# Patient Record
Sex: FEMALE | Race: BLACK OR AFRICAN AMERICAN | NOT HISPANIC OR LATINO | Employment: PART TIME | URBAN - METROPOLITAN AREA
[De-identification: names, ages, dates, MRNs, and addresses within clinical notes are randomized per-mention and may not be internally consistent; named-entity substitution may affect disease eponyms.]

---

## 2017-05-13 ENCOUNTER — HOSPITAL ENCOUNTER (EMERGENCY)
Facility: HOSPITAL | Age: 42
Discharge: HOME | End: 2017-05-13
Attending: EMERGENCY MEDICINE
Payer: MEDICAID

## 2017-05-13 VITALS
DIASTOLIC BLOOD PRESSURE: 83 MMHG | SYSTOLIC BLOOD PRESSURE: 129 MMHG | RESPIRATION RATE: 18 BRPM | HEART RATE: 64 BPM | HEIGHT: 66 IN | TEMPERATURE: 98 F | OXYGEN SATURATION: 99 % | BODY MASS INDEX: 25.71 KG/M2 | WEIGHT: 160 LBS

## 2017-05-13 DIAGNOSIS — R42 DIZZINESS: ICD-10-CM

## 2017-05-13 DIAGNOSIS — R74.8 ELEVATED LIPASE: ICD-10-CM

## 2017-05-13 DIAGNOSIS — R10.12 LEFT UPPER QUADRANT PAIN: Primary | ICD-10-CM

## 2017-05-13 LAB
ALBUMIN SERPL BCP-MCNC: 4.1 G/DL
ALP SERPL-CCNC: 64 U/L
ALT SERPL W/O P-5'-P-CCNC: 10 U/L
ANION GAP SERPL CALC-SCNC: 11 MMOL/L
AST SERPL-CCNC: 18 U/L
B-HCG UR QL: NEGATIVE
BASOPHILS # BLD AUTO: 0.02 K/UL
BASOPHILS NFR BLD: 0.3 %
BILIRUB SERPL-MCNC: 0.5 MG/DL
BILIRUB UR QL STRIP: NEGATIVE
BUN SERPL-MCNC: 13 MG/DL
CALCIUM SERPL-MCNC: 9.6 MG/DL
CHLORIDE SERPL-SCNC: 103 MMOL/L
CLARITY UR: CLEAR
CO2 SERPL-SCNC: 25 MMOL/L
COLOR UR: YELLOW
CREAT SERPL-MCNC: 1 MG/DL
CTP QC/QA: YES
DIFFERENTIAL METHOD: ABNORMAL
EOSINOPHIL # BLD AUTO: 0.2 K/UL
EOSINOPHIL NFR BLD: 2.6 %
ERYTHROCYTE [DISTWIDTH] IN BLOOD BY AUTOMATED COUNT: 13.7 %
EST. GFR  (AFRICAN AMERICAN): >60 ML/MIN/1.73 M^2
EST. GFR  (NON AFRICAN AMERICAN): >60 ML/MIN/1.73 M^2
GLUCOSE SERPL-MCNC: 106 MG/DL
GLUCOSE UR QL STRIP: NEGATIVE
HCT VFR BLD AUTO: 39.7 %
HGB BLD-MCNC: 13.2 G/DL
HGB UR QL STRIP: NEGATIVE
KETONES UR QL STRIP: NEGATIVE
LEUKOCYTE ESTERASE UR QL STRIP: NEGATIVE
LIPASE SERPL-CCNC: 74 U/L
LYMPHOCYTES # BLD AUTO: 3 K/UL
LYMPHOCYTES NFR BLD: 51.5 %
MCH RBC QN AUTO: 28.9 PG
MCHC RBC AUTO-ENTMCNC: 33.2 %
MCV RBC AUTO: 87 FL
MICROSCOPIC COMMENT: NORMAL
MONOCYTES # BLD AUTO: 0.5 K/UL
MONOCYTES NFR BLD: 8.4 %
NEUTROPHILS # BLD AUTO: 2.2 K/UL
NEUTROPHILS NFR BLD: 37.2 %
NITRITE UR QL STRIP: NEGATIVE
PH UR STRIP: 5 [PH] (ref 5–8)
PLATELET # BLD AUTO: 163 K/UL
PMV BLD AUTO: 11.4 FL
POTASSIUM SERPL-SCNC: 3.6 MMOL/L
PROT SERPL-MCNC: 7.4 G/DL
PROT UR QL STRIP: NEGATIVE
RBC # BLD AUTO: 4.57 M/UL
SODIUM SERPL-SCNC: 139 MMOL/L
SP GR UR STRIP: 1.02 (ref 1–1.03)
URN SPEC COLLECT METH UR: NORMAL
UROBILINOGEN UR STRIP-ACNC: NEGATIVE EU/DL
WBC # BLD AUTO: 5.86 K/UL

## 2017-05-13 PROCEDURE — 81000 URINALYSIS NONAUTO W/SCOPE: CPT

## 2017-05-13 PROCEDURE — 96372 THER/PROPH/DIAG INJ SC/IM: CPT

## 2017-05-13 PROCEDURE — 99284 EMERGENCY DEPT VISIT MOD MDM: CPT | Mod: 25

## 2017-05-13 PROCEDURE — 96374 THER/PROPH/DIAG INJ IV PUSH: CPT

## 2017-05-13 PROCEDURE — 80053 COMPREHEN METABOLIC PANEL: CPT

## 2017-05-13 PROCEDURE — 85025 COMPLETE CBC W/AUTO DIFF WBC: CPT

## 2017-05-13 PROCEDURE — 63600175 PHARM REV CODE 636 W HCPCS: Performed by: NURSE PRACTITIONER

## 2017-05-13 PROCEDURE — 93005 ELECTROCARDIOGRAM TRACING: CPT

## 2017-05-13 PROCEDURE — 81025 URINE PREGNANCY TEST: CPT | Performed by: EMERGENCY MEDICINE

## 2017-05-13 PROCEDURE — 83690 ASSAY OF LIPASE: CPT

## 2017-05-13 RX ORDER — ONDANSETRON 4 MG/1
4 TABLET, FILM COATED ORAL EVERY 8 HOURS PRN
Qty: 12 TABLET | Refills: 0 | Status: SHIPPED | OUTPATIENT
Start: 2017-05-13 | End: 2019-03-17

## 2017-05-13 RX ORDER — DICYCLOMINE HYDROCHLORIDE 10 MG/ML
20 INJECTION INTRAMUSCULAR
Status: COMPLETED | OUTPATIENT
Start: 2017-05-13 | End: 2017-05-13

## 2017-05-13 RX ORDER — DICYCLOMINE HYDROCHLORIDE 20 MG/1
20 TABLET ORAL EVERY 8 HOURS PRN
Qty: 20 TABLET | Refills: 0 | Status: SHIPPED | OUTPATIENT
Start: 2017-05-13 | End: 2019-03-17

## 2017-05-13 RX ORDER — ONDANSETRON 2 MG/ML
4 INJECTION INTRAMUSCULAR; INTRAVENOUS
Status: COMPLETED | OUTPATIENT
Start: 2017-05-13 | End: 2017-05-13

## 2017-05-13 RX ADMIN — DICYCLOMINE HYDROCHLORIDE 20 MG: 10 INJECTION INTRAMUSCULAR at 09:05

## 2017-05-13 RX ADMIN — ONDANSETRON 4 MG: 2 INJECTION INTRAMUSCULAR; INTRAVENOUS at 09:05

## 2017-05-13 NOTE — ED AVS SNAPSHOT
[facility]  [address] [place] [address]               Mahnaz Trivedi   2017  8:56 PM   ED    Description:  Female : 1975   Department:  [facility]           Your Care was Coordinated By:     Provider Role From To    Shubham Sal MD Attending Provider 17 [phone] --    JONATHAN Bond Nurse Practitioner 17 [phone] --      Reason for Visit     Abdominal Pain           Diagnoses this Visit        Comments    Left upper quadrant pain    -  Primary     Elevated lipase         Dizziness           ED Disposition     ED Disposition Condition Comment    Discharge             To Do List           Follow-up Information     Schedule an appointment as soon as possible for a visit with Your primary care doctor.    Why:  Next Week, For Follow-Up        Go to [facility].    Specialty:  Emergency Medicine    Why:  If symptoms worsen    Contact information:    [address] [id number] [phone]       These Medications        Disp Refills Start End    dicyclomine (BENTYL) 20 mg tablet 20 tablet 0 2017     Take 1 tablet (20 mg total) by mouth every 8 (eight) hours as needed (Abdominal pain). - Oral    ondansetron (ZOFRAN) 4 MG tablet 12 tablet 0 2017     Take 1 tablet (4 mg total) by mouth every 8 (eight) hours as needed for Nausea. - Oral      [facility] On [facility]     [facility] Nurse Care Line - 24/7 Assistance  Unless otherwise directed by your provider, please contact [facility], our nurse care line that is available for 24/7 assistance.     Registered nurses in the [facility] provide: appointment scheduling, clinical advisement, health education, and other advisory services.  Call: [phone] (toll free)               Medications           Message regarding Medications     Verify the changes and/or additions to your medication regime listed below  "are the same as discussed with your clinician today.  If any of these changes or additions are incorrect, please notify your healthcare provider.        START taking these NEW medications        Refills    dicyclomine (BENTYL) 20 mg tablet 0    Sig: Take 1 tablet (20 mg total) by mouth every 8 (eight) hours as needed (Abdominal pain).    Class: Print    Route: Oral    ondansetron (ZOFRAN) 4 MG tablet 0    Sig: Take 1 tablet (4 mg total) by mouth every 8 (eight) hours as needed for Nausea.    Class: Print    Route: Oral      These medications were administered today        Dose Freq    ondansetron injection 4 mg 4 mg ED 1 Time    Sig: Inject 4 mg into the vein ED 1 Time.    Class: Normal    Route: Intravenous    dicyclomine injection 20 mg 20 mg ED 1 Time    Sig: Inject 2 mLs (20 mg total) into the muscle ED 1 Time.    Class: Normal    Route: Intramuscular           Verify that the below list of medications is an accurate representation of the medications you are currently taking.  If none reported, the list may be blank. If incorrect, please contact your healthcare provider. Carry this list with you in case of emergency.           Current Medications     dicyclomine (BENTYL) 20 mg tablet Take 1 tablet (20 mg total) by mouth every 8 (eight) hours as needed (Abdominal pain).    hydrochlorothiazide (HYDRODIURIL) 12.5 MG Tab Take 12.5 mg by mouth once daily.    lisinopril 10 MG tablet Take 20 mg by mouth once daily.     ondansetron (ZOFRAN) 4 MG tablet Take 1 tablet (4 mg total) by mouth every 8 (eight) hours as needed for Nausea.    ondansetron (ZOFRAN-ODT) 8 MG TbDL Take 1 tablet (8 mg total) by mouth every 12 (twelve) hours as needed.           Clinical Reference Information           Your Vitals Were     BP Pulse Temp Resp Height Weight    151/80 (BP Location: Right arm, Patient Position: Sitting) 77 98.1 °F (36.7 °C) (Oral) 14 5' 6" (1.676 m) 72.6 kg (160 lb)    SpO2 BMI             100% 25.82 kg/m2       " "  Allergies as of 5/13/2017        Reactions    Pcn [Penicillins]     "makes me off balance"      Immunizations Administered on Date of Encounter - 5/13/2017     None      ED Micro, Lab, POCT     Start Ordered       Status Ordering Provider    05/13/17 2116 05/13/17 2116  CBC W/ AUTO DIFFERENTIAL  STAT      Final result     05/13/17 2116 05/13/17 2116  Comp. Metabolic Panel  STAT      Final result     05/13/17 2116 05/13/17 2116  Lipase  Once      Final result     05/13/17 2116 05/13/17 2116    STAT,   Status:  Canceled      Canceled     05/13/17 2035 05/13/17 2034  POCT urine pregnancy  Once      Final result     05/13/17 2035 05/13/17 2034  Urinalysis  Once      Final result     05/13/17 2034 05/13/17 2034  Urinalysis Microscopic  Once      Final result       ED Imaging Orders     Start Ordered       Status Ordering Provider    05/13/17 2116 05/13/17 2116  CT Abdomen Pelvis  Without Contrast  1 time imaging      Final result         Discharge Instructions       Please return to the Emergency Department for any new or worsening symptoms including: worsening or changes in the abdominal pain, nausea or vomiting, fever, chest pain, shortness of breath, loss of consciousness, dizziness, weakness, or any other concerns.     Please follow up with your Primary Care Provider within in the week. If you do not have a Primary Care Provider, you may contact the one listed on your discharge paperwork or you may also call the [facility]ointment Desk at [phone] to schedule an appointment with a Primary Care Provider.     Please take all medication as prescribed.       Discharge References/Attachments     ABDOMINAL PAIN, ADULT (ENGLISH)    LIPASE (ENGLISH)      [facility] Sign-Up     Activating your [facility] account is as easy as 1-2-3!     1) Visit [email or web address], select Sign Up Now, enter this activation code and your date of birth, then select Next.  [email or web address]  Expires: 6/27/2017 10:43 PM      2) Create a " username and password to use when you visit [facility] in the future and select a security question in case you lose your password and select Next.    3) Enter your e-mail address and click Sign Up!    Additional Information  If you have questions, please e-mail [email or web address] or call [phone] to talk to our [facility] staff. Remember, [facility] is NOT to be used for urgent needs. For medical emergencies, dial 911.         Smoking Cessation     If you would like to quit smoking:   You may be eligible for free services if you are a [place] resident and started smoking cigarettes before September 1, 1988.  Call the Smoking Cessation Trust ([facility]) toll free at (969) 130-1920 or (522) 315-8399.   Call 5-800-QUIT-NOW if you do not meet the above criteria.   Contact us via email: [email or web address]   View our website for more information: [email or web address]         [facility] complies with applicable Federal civil rights laws and does not discriminate on the basis of race, color, national origin, age, disability, or sex.        Language Assistance Services     ATTENTION: Language assistance services are available, free of charge. Please call [phone].      ATENCIÓN: Si taishala shagufta, tiene a perez disposición servicios gratuitos de asistencia lingüística. Llame al [phone].     CHÚ Ý: N?u b?n nói Ti?ng Vi?t, có các d?ch v? h? tr? ngôn ng? mi?n phí dành cho b?n. G?i s? [phone].

## 2017-05-14 NOTE — ED TRIAGE NOTES
Pt presents with left upper abd pain constant pain and states having some type of movement to abd.  States having constant movement but now localized to the Lt upper quadrant.  Shortness of breath, nausea, vomiting, diarrhea, and dizziness.  Pt states unable to sleep due to the pain.    Pain rates as 10.

## 2017-05-14 NOTE — DISCHARGE INSTRUCTIONS
Please return to the Emergency Department for any new or worsening symptoms including: worsening or changes in the abdominal pain, nausea or vomiting, fever, chest pain, shortness of breath, loss of consciousness, dizziness, weakness, or any other concerns.     Please follow up with your Primary Care Provider within in the week. If you do not have a Primary Care Provider, you may contact the one listed on your discharge paperwork or you may also call the [facility] Appointment [facility]k at [phone] to schedule an appointment with a Primary Care Provider.     Please take all medication as prescribed.

## 2017-05-14 NOTE — ED PROVIDER NOTES
"Encounter Date: 2017    SCRIBE #1 NOTE: I, Lashaun Pineda, am scribing for, and in the presence of,  Markell Harris NP. I have scribed the following portions of the note - Other sections scribed: HPI/ROS.       History     Chief Complaint   Patient presents with    Abdominal Pain     left side abd pain x7 days, constant pain with movement, NV in the beginning, some diarrhea     Review of patient's allergies indicates:   Allergen Reactions    Pcn [penicillins]      "makes me off balance"     HPI Comments: CC: Abdominal Pain    HPI: This 41 y.o. F who has history of HTN presents to the ED for evaluation of acute onset intermittent severe LUQ abdominal pain with associated nausea and decreased appetite for 7 days that worsened today. The pt reports intermittent dizziness when "getting up too fast." Her last episode of dizziness was last night while at work. The abdominal pain is exacerbated by deep inspiration. She states that this feels similar to kidney stones that she has experienced in the past. The pt attempted treatment with imodium with no relief. The pt denies fever, chills, vomiting, diarrhea, and any other associated symptoms.     The history is provided by the patient. No  was used.     Past Medical History:   Diagnosis Date    Hypertension      Past Surgical History:   Procedure Laterality Date     SECTION      TUBAL LIGATION       History reviewed. No pertinent family history.  Social History   Substance Use Topics    Smoking status: Current Some Day Smoker    Smokeless tobacco: None    Alcohol use No      Comment: social     Review of Systems   Constitutional: Positive for appetite change (decreased). Negative for chills and fever.   HENT: Negative for sore throat.    Eyes: Negative for visual disturbance.   Respiratory: Negative for cough and shortness of breath.    Cardiovascular: Negative for chest pain.   Gastrointestinal: Positive for abdominal pain (LUQ) " and nausea. Negative for vomiting.   Genitourinary: Negative for dysuria and vaginal bleeding.   Musculoskeletal: Negative for back pain and myalgias.   Skin: Negative for rash.   Neurological: Positive for dizziness (intermittent). Negative for syncope and headaches.       Physical Exam   Initial Vitals   BP Pulse Resp Temp SpO2   05/13/17 2030 05/13/17 2030 05/13/17 2030 05/13/17 2030 05/13/17 2030   151/80 77 14 98.1 °F (36.7 °C) 100 %     Physical Exam    Nursing note and vitals reviewed.  Constitutional: She appears well-developed and well-nourished. She is not diaphoretic. She is cooperative.  Non-toxic appearance. No distress.   HENT:   Head: Normocephalic and atraumatic.   Right Ear: External ear normal.   Left Ear: External ear normal.   Eyes: Conjunctivae and EOM are normal.   Neck: Normal range of motion. No tracheal deviation present.   Cardiovascular: Normal rate, regular rhythm and normal heart sounds. Exam reveals no gallop and no friction rub.    No murmur heard.  Pulmonary/Chest: Effort normal and breath sounds normal. No stridor. No tachypnea and no bradypnea. No respiratory distress. She has no wheezes. She has no rhonchi. She has no rales. She exhibits no tenderness.   Abdominal: Soft. Bowel sounds are normal. She exhibits no distension and no mass. There is no tenderness. There is no guarding.   Neurological: She is alert and oriented to person, place, and time. She has normal strength. No cranial nerve deficit or sensory deficit. She displays a negative Romberg sign. Coordination and gait normal. GCS eye subscore is 4. GCS verbal subscore is 5. GCS motor subscore is 6.   Cranial nerves II through XII grossly intact.   Skin: Skin is warm, dry and intact. No rash noted. No cyanosis or erythema. Nails show no clubbing.   Psychiatric: She has a normal mood and affect. Her behavior is normal. Judgment and thought content normal.         ED Course   Procedures  Labs Reviewed   CBC W/ AUTO  DIFFERENTIAL - Abnormal; Notable for the following:        Result Value    Gran% 37.2 (*)     Lymph% 51.5 (*)     All other components within normal limits   LIPASE - Abnormal; Notable for the following:     Lipase 74 (*)     All other components within normal limits   URINALYSIS   COMPREHENSIVE METABOLIC PANEL   URINALYSIS MICROSCOPIC   POCT URINE PREGNANCY             Medical Decision Making:   Clinical Tests:   Radiological Study: Ordered and Reviewed       APC / Resident Notes:   This is an evaluation of a 41-year-old female presents to the emergency Department for 7 day history of left upper abdominal pain.  She reports some diarrhea earlier in the week for which she treated it with Imodium and subsequently constipated for which she took mag citrate.  She reports intermittent short episodes of dizziness that occur with quick movements to standing and quick head movements and resolve spontaneously. Physical Exam shows a non-toxic, afebrile, and well appearing female.  Ears and throat without signs of infection.  Breath sounds clear and equal auscultation.  Heart regular rhythm with normal rate.  Her abdomen is soft with no tenderness to palpation throughout the abdomen.  Deep palpation of left upper quadrant and right upper quadrant without any complaints of pain from the patient.  Bowel sounds are regular.  There is no rebound, guarding, or peritoneal signs.  Ambulates with a steady and even gait.  She is neurologically intact.  There is no nystagmus noted. Vital Signs Are Reassuring. RESULTS: UA without infection.  UPT negative.  CBC with no leukocytosis or anemia.  Normal platelet count.  Unremarkable CMP.  Mildly of the lipase at 74.  Urine without signs of infection.  UPT negative.    My overall impression is abdominal pain, nausea and elevated lipase, dizziness. I considered, but at this time, do not suspect bowel obstruction, bowel perforation, appendicitis, pregnancy, pancreatitis, electrolyte  abnormality, UTI, pyelonephritis, kidney stone.  Her dizziness is intermittent and positional, and rapidly resolves on its own, and a normal neurological exam, and I feel it is central in origin.    ED Course: IV fluids, Bentyl, Zofran.  Reassessment: After the medications and prior to discharge, the patient reports her abdominal pain is improved.  She reports no episodes of dizziness or lightheadedness and emergency Department while sitting still or with position changes.  D/C Meds: Zofran and Bentyl. Additional D/C Information: [place] diet and progress as tolerated. The diagnosis, treatment plan, instructions for follow-up and reevaluation with her PCP on Monday for reevaluation of her elevated lipase as well as ED return precautions were discussed and understanding was verbalized. All questions or concerns have been addressed. This case was discussed with Dr. Sal who is in agreement with my assessment and plan. ANA LILIA Deluca, JONATHAN-C        Scribe Attestation:   Scribe #1: I performed the above scribed service and the documentation accurately describes the services I performed. I attest to the accuracy of the note.    Attending Attestation:           Physician Attestation for Scribe:  Physician Attestation Statement for Scribe #1: I, Markell Harris, JOHNNIE, reviewed documentation, as scribed by Lashaun Pineda in my presence, and it is both accurate and complete.                 ED Course     Clinical Impression:   The primary encounter diagnosis was Left upper quadrant pain. Diagnoses of Elevated lipase and Dizziness were also pertinent to this visit.    Disposition:   Disposition: Discharged  Condition: Stable       JONATHAN Bond  05/14/17 0038

## 2017-06-30 DIAGNOSIS — R10.2 FEMALE PELVIC PAIN: ICD-10-CM

## 2017-06-30 DIAGNOSIS — R19.00 PELVIC MASS: ICD-10-CM

## 2017-06-30 DIAGNOSIS — Z12.31 VISIT FOR SCREENING MAMMOGRAM: Primary | ICD-10-CM

## 2017-07-03 ENCOUNTER — HOSPITAL ENCOUNTER (OUTPATIENT)
Dept: RADIOLOGY | Facility: HOSPITAL | Age: 42
Discharge: HOME | End: 2017-07-03
Attending: OBSTETRICS & GYNECOLOGY
Payer: MEDICAID

## 2017-07-03 DIAGNOSIS — Z12.31 VISIT FOR SCREENING MAMMOGRAM: ICD-10-CM

## 2017-07-03 DIAGNOSIS — R19.00 PELVIC MASS: ICD-10-CM

## 2017-07-03 DIAGNOSIS — R10.2 FEMALE PELVIC PAIN: ICD-10-CM

## 2017-07-03 PROCEDURE — 76830 TRANSVAGINAL US NON-OB: CPT | Mod: 26,,, | Performed by: RADIOLOGY

## 2017-07-03 PROCEDURE — 77067 SCR MAMMO BI INCL CAD: CPT | Mod: 26,,, | Performed by: RADIOLOGY

## 2017-07-03 PROCEDURE — 76856 US EXAM PELVIC COMPLETE: CPT | Mod: TC

## 2017-07-03 PROCEDURE — 76856 US EXAM PELVIC COMPLETE: CPT | Mod: 26,,, | Performed by: RADIOLOGY

## 2017-07-03 PROCEDURE — 77063 BREAST TOMOSYNTHESIS BI: CPT | Mod: 26,,, | Performed by: RADIOLOGY

## 2017-07-03 PROCEDURE — 77067 SCR MAMMO BI INCL CAD: CPT | Mod: TC

## 2019-03-17 ENCOUNTER — HOSPITAL ENCOUNTER (EMERGENCY)
Facility: HOSPITAL | Age: 44
Discharge: HOME | End: 2019-03-17
Attending: EMERGENCY MEDICINE
Payer: COMMERCIAL

## 2019-03-17 VITALS
RESPIRATION RATE: 18 BRPM | DIASTOLIC BLOOD PRESSURE: 87 MMHG | SYSTOLIC BLOOD PRESSURE: 118 MMHG | WEIGHT: 150 LBS | BODY MASS INDEX: 24.11 KG/M2 | HEART RATE: 63 BPM | TEMPERATURE: 98 F | OXYGEN SATURATION: 100 % | HEIGHT: 66 IN

## 2019-03-17 DIAGNOSIS — R19.7 NAUSEA VOMITING AND DIARRHEA: Primary | ICD-10-CM

## 2019-03-17 DIAGNOSIS — R11.2 NAUSEA VOMITING AND DIARRHEA: Primary | ICD-10-CM

## 2019-03-17 LAB
ALBUMIN SERPL BCP-MCNC: 4.3 G/DL
ALP SERPL-CCNC: 69 U/L
ALT SERPL W/O P-5'-P-CCNC: 14 U/L
ANION GAP SERPL CALC-SCNC: 9 MMOL/L
AST SERPL-CCNC: 21 U/L
BASOPHILS # BLD AUTO: 0.01 K/UL
BASOPHILS NFR BLD: 0.1 %
BILIRUB SERPL-MCNC: 0.4 MG/DL
BILIRUB UR QL STRIP: NEGATIVE
BUN SERPL-MCNC: 16 MG/DL
CALCIUM SERPL-MCNC: 10 MG/DL
CHLORIDE SERPL-SCNC: 106 MMOL/L
CLARITY UR: CLEAR
CO2 SERPL-SCNC: 24 MMOL/L
COLOR UR: YELLOW
CREAT SERPL-MCNC: 0.9 MG/DL
DIFFERENTIAL METHOD: ABNORMAL
EOSINOPHIL # BLD AUTO: 0.1 K/UL
EOSINOPHIL NFR BLD: 0.9 %
ERYTHROCYTE [DISTWIDTH] IN BLOOD BY AUTOMATED COUNT: 13.3 %
EST. GFR  (AFRICAN AMERICAN): >60 ML/MIN/1.73 M^2
EST. GFR  (NON AFRICAN AMERICAN): >60 ML/MIN/1.73 M^2
GLUCOSE SERPL-MCNC: 86 MG/DL
GLUCOSE UR QL STRIP: NEGATIVE
HCT VFR BLD AUTO: 42.2 %
HGB BLD-MCNC: 13.7 G/DL
HGB UR QL STRIP: ABNORMAL
KETONES UR QL STRIP: NEGATIVE
LEUKOCYTE ESTERASE UR QL STRIP: NEGATIVE
LIPASE SERPL-CCNC: 23 U/L
LYMPHOCYTES # BLD AUTO: 1 K/UL
LYMPHOCYTES NFR BLD: 12.8 %
MCH RBC QN AUTO: 28.4 PG
MCHC RBC AUTO-ENTMCNC: 32.5 G/DL
MCV RBC AUTO: 88 FL
MICROSCOPIC COMMENT: NORMAL
MONOCYTES # BLD AUTO: 0.6 K/UL
MONOCYTES NFR BLD: 7.2 %
NEUTROPHILS # BLD AUTO: 6 K/UL
NEUTROPHILS NFR BLD: 79 %
NITRITE UR QL STRIP: NEGATIVE
PH UR STRIP: 8 [PH] (ref 5–8)
PLATELET # BLD AUTO: 217 K/UL
PMV BLD AUTO: 11.4 FL
POTASSIUM SERPL-SCNC: 3.5 MMOL/L
PROT SERPL-MCNC: 8 G/DL
PROT UR QL STRIP: NEGATIVE
RBC # BLD AUTO: 4.82 M/UL
RBC #/AREA URNS HPF: 4 /HPF (ref 0–4)
SODIUM SERPL-SCNC: 139 MMOL/L
SP GR UR STRIP: 1.02 (ref 1–1.03)
SQUAMOUS #/AREA URNS HPF: 3 /HPF
URN SPEC COLLECT METH UR: ABNORMAL
UROBILINOGEN UR STRIP-ACNC: NEGATIVE EU/DL
WBC # BLD AUTO: 7.59 K/UL
WBC #/AREA URNS HPF: 1 /HPF (ref 0–5)

## 2019-03-17 PROCEDURE — 99284 EMERGENCY DEPT VISIT MOD MDM: CPT | Mod: 25

## 2019-03-17 PROCEDURE — 83690 ASSAY OF LIPASE: CPT

## 2019-03-17 PROCEDURE — 63600175 PHARM REV CODE 636 W HCPCS: Performed by: PHYSICIAN ASSISTANT

## 2019-03-17 PROCEDURE — 80053 COMPREHEN METABOLIC PANEL: CPT

## 2019-03-17 PROCEDURE — 85025 COMPLETE CBC W/AUTO DIFF WBC: CPT

## 2019-03-17 PROCEDURE — 25000003 PHARM REV CODE 250: Performed by: PHYSICIAN ASSISTANT

## 2019-03-17 PROCEDURE — 96374 THER/PROPH/DIAG INJ IV PUSH: CPT

## 2019-03-17 PROCEDURE — 96361 HYDRATE IV INFUSION ADD-ON: CPT

## 2019-03-17 PROCEDURE — 81000 URINALYSIS NONAUTO W/SCOPE: CPT

## 2019-03-17 RX ORDER — DICYCLOMINE HYDROCHLORIDE 10 MG/1
20 CAPSULE ORAL
Status: COMPLETED | OUTPATIENT
Start: 2019-03-17 | End: 2019-03-17

## 2019-03-17 RX ORDER — ONDANSETRON 2 MG/ML
8 INJECTION INTRAMUSCULAR; INTRAVENOUS
Status: COMPLETED | OUTPATIENT
Start: 2019-03-17 | End: 2019-03-17

## 2019-03-17 RX ORDER — ACETAMINOPHEN 500 MG
1000 TABLET ORAL
Status: COMPLETED | OUTPATIENT
Start: 2019-03-17 | End: 2019-03-17

## 2019-03-17 RX ORDER — ONDANSETRON 4 MG/1
8 TABLET, FILM COATED ORAL EVERY 12 HOURS PRN
Qty: 12 TABLET | Refills: 0 | Status: SHIPPED | OUTPATIENT
Start: 2019-03-17 | End: 2019-10-31 | Stop reason: CLARIF

## 2019-03-17 RX ORDER — DICYCLOMINE HYDROCHLORIDE 20 MG/1
20 TABLET ORAL 4 TIMES DAILY
Qty: 12 TABLET | Refills: 0 | Status: SHIPPED | OUTPATIENT
Start: 2019-03-17 | End: 2019-03-20

## 2019-03-17 RX ADMIN — ACETAMINOPHEN 1000 MG: 500 TABLET, FILM COATED ORAL at 01:03

## 2019-03-17 RX ADMIN — DICYCLOMINE HYDROCHLORIDE 20 MG: 10 CAPSULE ORAL at 01:03

## 2019-03-17 RX ADMIN — SODIUM CHLORIDE 1000 ML: 0.9 INJECTION, SOLUTION INTRAVENOUS at 01:03

## 2019-03-17 RX ADMIN — ONDANSETRON 8 MG: 2 INJECTION INTRAMUSCULAR; INTRAVENOUS at 01:03

## 2019-03-17 NOTE — ED TRIAGE NOTES
"Patient presented to the ED stating "Gabriela been having NVD since Wednesday." Patient stated that there is a virus going around her job and she thinks she has it. Patient denies any abd pain or discharge. Patient denies any dysuria.  "

## 2019-03-17 NOTE — ED PROVIDER NOTES
"Encounter Date: 3/17/2019    This is a SORT/MSE of a 43 y.o. female presenting to the ED with c/o vomiting and diarrhea x 4 days. Reports sick contact with similar. Reports fevers at home. Just drank prior to attempting temp. Will re-check. Care will be transferred to an alternate provider when patient is roomed for a full evaluation and final disposition. ANA LILIA Deluca, FNP-ONEAL 3/17/2019 12:34 PM       History     Chief Complaint   Patient presents with    Emesis     vomiting, diarrhea, and fever x 4 days.       43-year-old female with no pertinent past medical history presents to the emergency department for 4 days of nausea associated with vomiting and diarrhea.  Denies pain, including for abdominal pain.  Patient notes multiple sick contacts with similar symptoms.  Denies fever.  Denies urinary symptoms. No medications taken prior to arrival.  States she has had history of similar symptoms or diagnosis of viral process.  States she is unable tolerate p.o., including for sips of water.          Review of patient's allergies indicates:   Allergen Reactions    Pcn [penicillins]      "makes me off balance"     Past Medical History:   Diagnosis Date    Hypertension      Past Surgical History:   Procedure Laterality Date     SECTION      TUBAL LIGATION       History reviewed. No pertinent family history.  Social History     Tobacco Use    Smoking status: Current Some Day Smoker   Substance Use Topics    Alcohol use: No     Comment: social    Drug use: No     Review of Systems   Constitutional: Negative for fever.   HENT: Negative for congestion and sore throat.    Respiratory: Negative for shortness of breath.    Cardiovascular: Negative for chest pain.   Gastrointestinal: Positive for diarrhea, nausea and vomiting. Negative for abdominal pain.   Genitourinary: Negative for decreased urine volume, dysuria, flank pain, hematuria, pelvic pain, urgency, vaginal bleeding and vaginal discharge. "   Musculoskeletal: Negative for back pain and neck pain.   Skin: Negative for rash.   Neurological: Negative for headaches.   All other systems reviewed and are negative.      Physical Exam     Initial Vitals   BP Pulse Resp Temp SpO2   03/17/19 1232 03/17/19 1232 03/17/19 1232 03/17/19 1419 03/17/19 1232   137/81 83 20 98.3 °F (36.8 °C) 100 %      MAP       --                Physical Exam    Nursing note and vitals reviewed.  Constitutional: She appears well-developed and well-nourished. She is not diaphoretic. No distress.   Actively drinking beverage without complication   HENT:   Head: Normocephalic and atraumatic.   Nose: Nose normal.   Slightly dry mucous membranes   Eyes: Conjunctivae and EOM are normal. Right eye exhibits no discharge. Left eye exhibits no discharge.   Neck: Normal range of motion. No tracheal deviation present. No JVD present.   Cardiovascular: Normal rate, regular rhythm and normal heart sounds. Exam reveals no friction rub.    No murmur heard.  Pulmonary/Chest: Breath sounds normal. No stridor. No respiratory distress. She has no wheezes. She has no rhonchi. She has no rales. She exhibits no tenderness.   Abdominal: Soft. She exhibits no distension. There is no tenderness. There is no rigidity, no rebound, no guarding, no CVA tenderness, no tenderness at McBurney's point and negative Negron's sign.   Musculoskeletal: Normal range of motion.   Neurological: She is alert and oriented to person, place, and time.   Skin: Skin is warm and dry. No rash and no abscess noted. No erythema. No pallor.         ED Course   Procedures  Labs Reviewed   CBC W/ AUTO DIFFERENTIAL - Abnormal; Notable for the following components:       Result Value    Gran% 79.0 (*)     Lymph% 12.8 (*)     All other components within normal limits   URINALYSIS, REFLEX TO URINE CULTURE - Abnormal; Notable for the following components:    Occult Blood UA 2+ (*)     All other components within normal limits    Narrative:      Preferred Collection Type->Urine, Clean Catch   COMPREHENSIVE METABOLIC PANEL   LIPASE   URINALYSIS MICROSCOPIC    Narrative:     Preferred Collection Type->Urine, Clean Catch   POCT URINE PREGNANCY          Imaging Results    None          Medical Decision Making:   History:   Old Medical Records: I decided to obtain old medical records.    This is an emergent evaluation of a 43 y.o. female presenting to the ED for nausea, non-bloody/bilious vomiting, and non-bloody diarrhea. Denies fever, significant weight loss, recent hospitalization or use of antibiotics, or symptoms lasting greater than a week. Endorses sick contacts with similar symptoms. Afebrile. Patient is non-toxic appearing and in no acute distress.    Reports complete resolution of all symptoms. Appears well and is now tolerating PO. Vitals stable. Repeat abdominal exam benign.    Given rapid onset and characteristics of this patient's spectrum of symptoms, short duration of symptoms, and benign abdominal exam, patient likely has a variety of viral gastroenteritis. I do not feel there is indication for stool studies to assess for bacterial etiology at this time. No strong indication for imaging of abdomen at this time. Given the above, I have also considered but doubt C. difficile, IBD, infectious colitis, DKA, SBO, pancreatitis, acute cholecystitis, pyelonephritis, ureteral stone, and appendicitis.     Discharged home with supportive care. Advised maintaining adequate hydration and switching to a liquid diet; advising diet as tolerated. Instructed to follow up with PCP for reevaluation and management of symptoms.     I discussed with the patient the diagnosis, treatment plan, indications for return to the emergency department, and for expected follow-up. The patient verbalized an understanding. The patient is asked if there are any questions or concerns. We discuss the case, until all issues are addressed to the patients satisfaction. Patient  understands and is agreeable to the plan.                    Clinical Impression:       ICD-10-CM ICD-9-CM   1. Nausea vomiting and diarrhea R11.2 787.91    R19.7 787.01                                Jung Cabrales PA-C  03/17/19 1529

## 2019-05-03 DIAGNOSIS — Z12.39 SCREENING BREAST EXAMINATION: Primary | ICD-10-CM

## 2019-08-15 ENCOUNTER — HOSPITAL ENCOUNTER (EMERGENCY)
Facility: HOSPITAL | Age: 44
Discharge: HOME | End: 2019-08-15
Attending: EMERGENCY MEDICINE
Payer: COMMERCIAL

## 2019-08-15 VITALS
DIASTOLIC BLOOD PRESSURE: 74 MMHG | HEART RATE: 54 BPM | HEIGHT: 66 IN | RESPIRATION RATE: 18 BRPM | BODY MASS INDEX: 25.71 KG/M2 | OXYGEN SATURATION: 99 % | TEMPERATURE: 98 F | SYSTOLIC BLOOD PRESSURE: 140 MMHG | WEIGHT: 160 LBS

## 2019-08-15 DIAGNOSIS — M25.539 WRIST PAIN: ICD-10-CM
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
## 2019-08-15 LAB
B-HCG UR QL: NEGATIVE
CTP QC/QA: YES

## 2019-08-15 PROCEDURE — 25000003 PHARM REV CODE 250: Performed by: NURSE PRACTITIONER

## 2019-08-15 PROCEDURE — 99284 EMERGENCY DEPT VISIT MOD MDM: CPT | Mod: 25

## 2019-08-15 PROCEDURE — 81025 URINE PREGNANCY TEST: CPT | Performed by: PHYSICIAN ASSISTANT

## 2019-08-15 RX ORDER — IBUPROFEN 600 MG/1
600 TABLET ORAL
Status: COMPLETED | OUTPATIENT
Start: 2019-08-15 | End: 2019-08-15

## 2019-08-15 RX ORDER — CYCLOBENZAPRINE HCL 10 MG
10 TABLET ORAL 3 TIMES DAILY PRN
Qty: 15 TABLET | Refills: 0 | Status: SHIPPED | OUTPATIENT
Start: 2019-08-15 | End: 2019-08-20

## 2019-08-15 RX ORDER — IBUPROFEN 600 MG/1
600 TABLET ORAL EVERY 6 HOURS PRN
Qty: 20 TABLET | Refills: 0 | Status: SHIPPED | OUTPATIENT
Start: 2019-08-15 | End: 2019-10-31 | Stop reason: CLARIF

## 2019-08-15 RX ADMIN — IBUPROFEN 600 MG: 600 TABLET, FILM COATED ORAL at 05:08
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
## 2019-08-15 NOTE — DISCHARGE INSTRUCTIONS
You have been prescribed FLEXERIL for pain. Please do not take this medication while working, drinking alcohol, swimming, or while driving/operating heavy machinery. This medication may cause drowsiness, impair judgment, and reduce physical capabilities.    You have been prescribed ibuprofen for pain. This is an Non-Steroidal Anti-Inflammatory (NSAID) Medication. Please do not take any additional NSAIDs while you are taking this medication including (Advil, Aleve, Motrin, Ibuprofen, Mobic\meloxicam, Naprosyn, etc.). Please stop taking this medication if you experience: weakness, itching, yellow skin or eyes, joint pains, vomiting blood, blood or black stools, unusual weight gain, or swelling in your arms, legs, hands, or feet.     Please return to the Emergency Department for any new or worsening symptoms including: fever, chest pain, shortness of breath, loss of consciousness, dizziness, weakness, or any other concerns.     Please follow up with your Primary Care Provider within in the week. If you do not have one, you may contact the one listed on your discharge paperwork or you may also call the [facility] Appointment Desk at [phone] to schedule an appointment with one.     Please take all medication as prescribed.

## 2019-10-10 ENCOUNTER — TELEPHONE (OUTPATIENT)
Dept: SURGERY | Facility: CLINIC | Age: 44
End: 2019-10-10

## 2019-10-10 NOTE — TELEPHONE ENCOUNTER
----- Message from Robbi Jones sent at 10/10/2019  4:11 PM CDT -----  Contact: PT  Type:  Needs Medical Advice    Who Called: The Pt would like for Destiny to call her back please.    Best Call Back Number: [phone]

## 2019-10-21 ENCOUNTER — OFFICE VISIT (OUTPATIENT)
Dept: ORTHOPEDICS | Facility: CLINIC | Age: 44
End: 2019-10-21
Payer: COMMERCIAL

## 2019-10-21 VITALS
HEIGHT: 66 IN | BODY MASS INDEX: 25.71 KG/M2 | SYSTOLIC BLOOD PRESSURE: 97 MMHG | WEIGHT: 160 LBS | DIASTOLIC BLOOD PRESSURE: 62 MMHG | HEART RATE: 96 BPM

## 2019-10-21 DIAGNOSIS — M67.431 GANGLION CYST OF VOLAR ASPECT OF RIGHT WRIST: Primary | ICD-10-CM

## 2019-10-21 PROCEDURE — 99213 OFFICE O/P EST LOW 20 MIN: CPT | Mod: PBBFAC | Performed by: PHYSICIAN ASSISTANT

## 2019-10-21 PROCEDURE — 99999 PR PBB SHADOW E&M-EST. PATIENT-LVL III: ICD-10-PCS | Mod: PBBFAC,,, | Performed by: PHYSICIAN ASSISTANT

## 2019-10-21 PROCEDURE — 99204 PR OFFICE/OUTPT VISIT, NEW, LEVL IV, 45-59 MIN: ICD-10-PCS | Mod: S$GLB,,, | Performed by: PHYSICIAN ASSISTANT

## 2019-10-21 PROCEDURE — 99999 PR PBB SHADOW E&M-EST. PATIENT-LVL III: CPT | Mod: PBBFAC,,, | Performed by: PHYSICIAN ASSISTANT

## 2019-10-21 PROCEDURE — 99204 OFFICE O/P NEW MOD 45 MIN: CPT | Mod: S$GLB,,, | Performed by: PHYSICIAN ASSISTANT
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
## 2019-10-21 NOTE — LETTER
October 21, 2019               [facility] [address] [facility] [address]  Orthopedics  [address], [address]  [facility] [address]  Phone: [phone]   October 21, 2019     Patient: Mahnaz Trivedi   YOB: 1975   Date of Visit: 10/21/2019       To Whom it May Concern:    Mahnaz Trivedi was seen in my clinic on 10/21/2019. Please excuse her from any work missed.    If you have any questions or concerns, please don't hesitate to call.    Sincerely,       Melida Pleitez PA-C
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
## 2019-10-23 ENCOUNTER — TELEPHONE (OUTPATIENT)
Dept: ORTHOPEDICS | Facility: CLINIC | Age: 44
End: 2019-10-23

## 2019-10-31 ENCOUNTER — TELEPHONE (OUTPATIENT)
Dept: ORTHOPEDICS | Facility: CLINIC | Age: 44
End: 2019-10-31

## 2019-10-31 ENCOUNTER — ANESTHESIA EVENT (OUTPATIENT)
Dept: SURGERY | Facility: HOSPITAL | Age: 44
End: 2019-10-31
Payer: COMMERCIAL

## 2019-10-31 RX ORDER — CLINDAMYCIN PHOSPHATE 900 MG/50ML
900 INJECTION, SOLUTION INTRAVENOUS
Status: CANCELLED | OUTPATIENT
Start: 2019-10-31

## 2019-10-31 RX ORDER — LISINOPRIL AND HYDROCHLOROTHIAZIDE 12.5; 2 MG/1; MG/1
1 TABLET ORAL DAILY
COMMUNITY

## 2019-10-31 NOTE — TELEPHONE ENCOUNTER
Informed patient of arrival time for surgery (550am), location, and scheduled patient for 2 week post op visit. Patient verbalized understanding.

## 2019-10-31 NOTE — PRE-PROCEDURE INSTRUCTIONS
Preop instructions:     NPO instructions: NO solids foods,non-clear liquids including milk, milk products, creamers, gum, mints, or hard candy after midnight the night prior to your surgery.     We encourage a limited consumption of CLEAR LIQUIDS after midnight the night before your surgery.     Acceptable Clear Liquids are listed below:    Water,  Gatorade/Powerade sports drinks, Apple juice (no pulp)     You may drink a total of 16 ounces of any of the acceptable Clear Liquids after midnight up to ONE (1) HOUR before you are told to arrive for your surgery.   Your last clear liquid beverage should NOT be more than eight (8) ounces, consumed within the last hour you are allowed to have clear liquids.     If you have received specific instructions from your Surgeon/Surgeon's staff, please follow their instructions.     Showering instructions, directions, leave all valuables at home, medication instructions for PM prior & am of procedure explained. Patient stated an understanding.      Patient denies any side effects or issues with anesthesia or sedation.                                                                                                                                    Patient does not know arrival time. Explained that this information comes from the surgeons office and if they have not heard from them by 2 pm, to call office. Patient stated an understanding.

## 2019-11-01 ENCOUNTER — HOSPITAL ENCOUNTER (OUTPATIENT)
Facility: HOSPITAL | Age: 44
Discharge: HOME | End: 2019-11-01
Attending: ORTHOPAEDIC SURGERY | Admitting: ORTHOPAEDIC SURGERY
Payer: COMMERCIAL

## 2019-11-01 ENCOUNTER — ANESTHESIA (OUTPATIENT)
Dept: SURGERY | Facility: HOSPITAL | Age: 44
End: 2019-11-01
Payer: COMMERCIAL

## 2019-11-01 VITALS
RESPIRATION RATE: 18 BRPM | BODY MASS INDEX: 26.36 KG/M2 | SYSTOLIC BLOOD PRESSURE: 136 MMHG | WEIGHT: 164 LBS | TEMPERATURE: 98 F | OXYGEN SATURATION: 100 % | HEIGHT: 66 IN | HEART RATE: 74 BPM | DIASTOLIC BLOOD PRESSURE: 73 MMHG

## 2019-11-01 DIAGNOSIS — M67.40 GANGLION: Primary | ICD-10-CM

## 2019-11-01 PROCEDURE — 25000003 PHARM REV CODE 250: Performed by: ANESTHESIOLOGY

## 2019-11-01 PROCEDURE — D9220A PRA ANESTHESIA: ICD-10-PCS | Mod: ANES,,, | Performed by: ANESTHESIOLOGY

## 2019-11-01 PROCEDURE — 27201423 OPTIME MED/SURG SUP & DEVICES STERILE SUPPLY: Performed by: ORTHOPAEDIC SURGERY

## 2019-11-01 PROCEDURE — 64415 NJX AA&/STRD BRCH PLXS IMG: CPT | Mod: 59 | Performed by: ANESTHESIOLOGY

## 2019-11-01 PROCEDURE — 01810 ANES PX NRV MUSC F/ARM WRST: CPT | Performed by: ORTHOPAEDIC SURGERY

## 2019-11-01 PROCEDURE — 71000033 HC RECOVERY, INTIAL HOUR: Performed by: ORTHOPAEDIC SURGERY

## 2019-11-01 PROCEDURE — 64415 PR NERVE BLOCK INJ, ANES/STEROID, BRACHIAL PLEXUS, INCL IMAG GUIDANCE: ICD-10-PCS | Mod: 59,RT,, | Performed by: ANESTHESIOLOGY

## 2019-11-01 PROCEDURE — D9220A PRA ANESTHESIA: Mod: CRNA,,, | Performed by: NURSE ANESTHETIST, CERTIFIED REGISTERED

## 2019-11-01 PROCEDURE — 37000008 HC ANESTHESIA 1ST 15 MINUTES: Performed by: ORTHOPAEDIC SURGERY

## 2019-11-01 PROCEDURE — 37000009 HC ANESTHESIA EA ADD 15 MINS: Performed by: ORTHOPAEDIC SURGERY

## 2019-11-01 PROCEDURE — 25111 PR EXCIS PRIMARY GANGLION WRIST: ICD-10-PCS | Mod: RT,,, | Performed by: ORTHOPAEDIC SURGERY

## 2019-11-01 PROCEDURE — 88304 TISSUE EXAM BY PATHOLOGIST: CPT | Performed by: PATHOLOGY

## 2019-11-01 PROCEDURE — 71000015 HC POSTOP RECOV 1ST HR: Performed by: ORTHOPAEDIC SURGERY

## 2019-11-01 PROCEDURE — 63600175 PHARM REV CODE 636 W HCPCS: Performed by: ANESTHESIOLOGY

## 2019-11-01 PROCEDURE — 25000003 PHARM REV CODE 250: Performed by: NURSE ANESTHETIST, CERTIFIED REGISTERED

## 2019-11-01 PROCEDURE — 94770 HC EXHALED C02 TEST: CPT

## 2019-11-01 PROCEDURE — 76942 ECHO GUIDE FOR BIOPSY: CPT | Performed by: ANESTHESIOLOGY

## 2019-11-01 PROCEDURE — 99900035 HC TECH TIME PER 15 MIN (STAT)

## 2019-11-01 PROCEDURE — 88304 TISSUE SPECIMEN TO PATHOLOGY - SURGERY: ICD-10-PCS | Mod: 26,,, | Performed by: PATHOLOGY

## 2019-11-01 PROCEDURE — 63600175 PHARM REV CODE 636 W HCPCS: Performed by: NURSE ANESTHETIST, CERTIFIED REGISTERED

## 2019-11-01 PROCEDURE — 76942 PR U/S GUIDANCE FOR NEEDLE GUIDANCE: ICD-10-PCS | Mod: 26,,, | Performed by: ANESTHESIOLOGY

## 2019-11-01 PROCEDURE — S0077 INJECTION, CLINDAMYCIN PHOSP: HCPCS | Performed by: NURSE ANESTHETIST, CERTIFIED REGISTERED

## 2019-11-01 PROCEDURE — 76942 ECHO GUIDE FOR BIOPSY: CPT | Mod: 26,,, | Performed by: ANESTHESIOLOGY

## 2019-11-01 PROCEDURE — D9220A PRA ANESTHESIA: ICD-10-PCS | Mod: CRNA,,, | Performed by: NURSE ANESTHETIST, CERTIFIED REGISTERED

## 2019-11-01 PROCEDURE — 88304 TISSUE EXAM BY PATHOLOGIST: CPT | Mod: 26,,, | Performed by: PATHOLOGY

## 2019-11-01 PROCEDURE — 64415 NJX AA&/STRD BRCH PLXS IMG: CPT | Mod: 59,RT,, | Performed by: ANESTHESIOLOGY

## 2019-11-01 PROCEDURE — 94760 N-INVAS EAR/PLS OXIMETRY 1: CPT

## 2019-11-01 PROCEDURE — 36000707: Performed by: ORTHOPAEDIC SURGERY

## 2019-11-01 PROCEDURE — 36000706: Performed by: ORTHOPAEDIC SURGERY

## 2019-11-01 PROCEDURE — 25111 REMOVE WRIST TENDON LESION: CPT | Mod: RT,,, | Performed by: ORTHOPAEDIC SURGERY

## 2019-11-01 PROCEDURE — D9220A PRA ANESTHESIA: Mod: ANES,,, | Performed by: ANESTHESIOLOGY

## 2019-11-01 RX ORDER — OXYCODONE HYDROCHLORIDE 5 MG/1
5 TABLET ORAL
Status: DISCONTINUED | OUTPATIENT
Start: 2019-11-01 | End: 2019-11-01 | Stop reason: HOSPADM

## 2019-11-01 RX ORDER — ROPIVACAINE HYDROCHLORIDE 5 MG/ML
INJECTION, SOLUTION EPIDURAL; INFILTRATION; PERINEURAL
Status: COMPLETED | OUTPATIENT
Start: 2019-11-01 | End: 2019-11-01

## 2019-11-01 RX ORDER — MIDAZOLAM HYDROCHLORIDE 1 MG/ML
0.5 INJECTION INTRAMUSCULAR; INTRAVENOUS
Status: DISCONTINUED | OUTPATIENT
Start: 2019-11-01 | End: 2019-11-01 | Stop reason: HOSPADM

## 2019-11-01 RX ORDER — LIDOCAINE HYDROCHLORIDE 10 MG/ML
INJECTION, SOLUTION INTRAVENOUS
Status: DISCONTINUED | OUTPATIENT
Start: 2019-11-01 | End: 2019-11-01

## 2019-11-01 RX ORDER — METOCLOPRAMIDE HYDROCHLORIDE 5 MG/ML
10 INJECTION INTRAMUSCULAR; INTRAVENOUS EVERY 10 MIN PRN
Status: DISCONTINUED | OUTPATIENT
Start: 2019-11-01 | End: 2019-11-01 | Stop reason: HOSPADM

## 2019-11-01 RX ORDER — CELECOXIB 200 MG/1
400 CAPSULE ORAL
Status: COMPLETED | OUTPATIENT
Start: 2019-11-01 | End: 2019-11-01

## 2019-11-01 RX ORDER — SODIUM CHLORIDE 9 MG/ML
INJECTION, SOLUTION INTRAVENOUS CONTINUOUS PRN
Status: DISCONTINUED | OUTPATIENT
Start: 2019-11-01 | End: 2019-11-01

## 2019-11-01 RX ORDER — PROPOFOL 10 MG/ML
VIAL (ML) INTRAVENOUS CONTINUOUS PRN
Status: DISCONTINUED | OUTPATIENT
Start: 2019-11-01 | End: 2019-11-01

## 2019-11-01 RX ORDER — FENTANYL CITRATE 50 UG/ML
25 INJECTION, SOLUTION INTRAMUSCULAR; INTRAVENOUS EVERY 5 MIN PRN
Status: DISCONTINUED | OUTPATIENT
Start: 2019-11-01 | End: 2019-11-01 | Stop reason: HOSPADM

## 2019-11-01 RX ORDER — ACETAMINOPHEN 500 MG
1000 TABLET ORAL
Status: COMPLETED | OUTPATIENT
Start: 2019-11-01 | End: 2019-11-01

## 2019-11-01 RX ORDER — MIDAZOLAM HYDROCHLORIDE 1 MG/ML
INJECTION, SOLUTION INTRAMUSCULAR; INTRAVENOUS
Status: DISCONTINUED | OUTPATIENT
Start: 2019-11-01 | End: 2019-11-01

## 2019-11-01 RX ORDER — ONDANSETRON 2 MG/ML
INJECTION INTRAMUSCULAR; INTRAVENOUS
Status: DISCONTINUED | OUTPATIENT
Start: 2019-11-01 | End: 2019-11-01

## 2019-11-01 RX ORDER — SODIUM CHLORIDE 9 MG/ML
INJECTION, SOLUTION INTRAVENOUS CONTINUOUS
Status: DISCONTINUED | OUTPATIENT
Start: 2019-11-01 | End: 2019-11-01 | Stop reason: HOSPADM

## 2019-11-01 RX ORDER — DEXAMETHASONE SODIUM PHOSPHATE 4 MG/ML
INJECTION, SOLUTION INTRA-ARTICULAR; INTRALESIONAL; INTRAMUSCULAR; INTRAVENOUS; SOFT TISSUE
Status: DISCONTINUED | OUTPATIENT
Start: 2019-11-01 | End: 2019-11-01

## 2019-11-01 RX ORDER — ACETAMINOPHEN AND CODEINE PHOSPHATE 300; 30 MG/1; MG/1
1 TABLET ORAL EVERY 6 HOURS PRN
Qty: 12 TABLET | Refills: 0 | Status: SHIPPED | OUTPATIENT
Start: 2019-11-01 | End: 2019-11-04

## 2019-11-01 RX ORDER — CLINDAMYCIN PHOSPHATE 900 MG/50ML
INJECTION, SOLUTION INTRAVENOUS
Status: DISCONTINUED | OUTPATIENT
Start: 2019-11-01 | End: 2019-11-01

## 2019-11-01 RX ADMIN — CLINDAMYCIN PHOSPHATE 900 MG: 18 INJECTION, SOLUTION INTRAVENOUS at 11:11

## 2019-11-01 RX ADMIN — MIDAZOLAM 2 MG: 1 INJECTION INTRAMUSCULAR; INTRAVENOUS at 11:11

## 2019-11-01 RX ADMIN — ROPIVACAINE HYDROCHLORIDE 30 ML: 5 INJECTION, SOLUTION EPIDURAL; INFILTRATION; PERINEURAL at 11:11

## 2019-11-01 RX ADMIN — MIDAZOLAM HYDROCHLORIDE 2 MG: 1 INJECTION, SOLUTION INTRAMUSCULAR; INTRAVENOUS at 11:11

## 2019-11-01 RX ADMIN — LIDOCAINE HYDROCHLORIDE 100 MG: 10 INJECTION, SOLUTION INTRAVENOUS at 11:11

## 2019-11-01 RX ADMIN — SODIUM CHLORIDE: 0.9 INJECTION, SOLUTION INTRAVENOUS at 11:11

## 2019-11-01 RX ADMIN — CELECOXIB 400 MG: 200 CAPSULE ORAL at 10:11

## 2019-11-01 RX ADMIN — ACETAMINOPHEN 1000 MG: 500 TABLET ORAL at 10:11

## 2019-11-01 RX ADMIN — DEXAMETHASONE SODIUM PHOSPHATE 4 MG: 4 INJECTION, SOLUTION INTRAMUSCULAR; INTRAVENOUS at 11:11

## 2019-11-01 RX ADMIN — FENTANYL CITRATE 100 MCG: 50 INJECTION, SOLUTION INTRAMUSCULAR; INTRAVENOUS at 11:11

## 2019-11-01 RX ADMIN — ONDANSETRON 4 MG: 2 INJECTION INTRAMUSCULAR; INTRAVENOUS at 11:11

## 2019-11-01 RX ADMIN — PROPOFOL 125 MCG/KG/MIN: 10 INJECTION, EMULSION INTRAVENOUS at 11:11

## 2019-11-01 NOTE — PLAN OF CARE
Patient tolerating oral liquids without difficulty. No apparent s&s of distress noted at this time, no complaints voiced at this time. Discharge instructions reviewed with patient and daughter with good verbal feedback received. Patient ready for discharge.

## 2019-11-01 NOTE — ANESTHESIA POSTPROCEDURE EVALUATION
Anesthesia Post Evaluation    Patient: Mahnaz Trivedi    Procedure(s) Performed: Procedure(s) (LRB):  EXCISION, GANGLION CYST, WRIST right volar wrist (Right)    Final Anesthesia Type: general  Patient location during evaluation: PACU  Patient participation: Yes- Able to Participate  Level of consciousness: awake and alert and oriented  Post-procedure vital signs: reviewed and stable  Pain management: adequate  Airway patency: patent  PONV status at discharge: No PONV  Anesthetic complications: no      Cardiovascular status: blood pressure returned to baseline  Respiratory status: unassisted, spontaneous ventilation and room air  Hydration status: euvolemic  Follow-up not needed.          Vitals Value Taken Time   /73 11/1/2019  1:17 PM   Temp 36.4 °C (97.5 °F) 11/1/2019  1:00 PM   Pulse 70 11/1/2019  1:22 PM   Resp 22 11/1/2019  1:22 PM   SpO2 100 % 11/1/2019  1:22 PM   Vitals shown include unvalidated device data.      Event Time     Out of Recovery 12:31:00          Pain/Moustapha Score: Pain Rating Prior to Med Admin: 0 (11/1/2019 10:27 AM)  Moustapha Score: 10 (11/1/2019  1:00 PM)

## 2019-11-01 NOTE — DISCHARGE INSTRUCTIONS

## 2019-11-01 NOTE — TRANSFER OF CARE
"Anesthesia Transfer of Care Note    Patient: Mahnaz Trivedi    Procedure(s) Performed: Procedure(s) (LRB):  EXCISION, GANGLION CYST, WRIST right volar wrist (Right)    Patient location: PACU    Anesthesia Type: regional and general    Transport from OR: Transported from OR on room air with adequate spontaneous ventilation    Post pain: adequate analgesia    Post assessment: no apparent anesthetic complications and tolerated procedure well    Post vital signs: stable    Level of consciousness: awake and seizure activity    Complications: none    Transfer of care protocol was followed      Last vitals:   Visit Vitals  /60 (BP Location: Left arm, Patient Position: Lying)   Pulse 75   Temp 36.5 °C (97.7 °F) (Oral)   Resp 16   Ht 5' 6" (1.676 m)   Wt 74.4 kg (164 lb)   SpO2 100%   Breastfeeding? No   BMI 26.47 kg/m²     "

## 2019-11-01 NOTE — INTERVAL H&P NOTE
The patient has been examined and the H&P has been reviewed:    I concur with the findings and no changes have occurred since H&P was written.    Anesthesia/Surgery risks, benefits and alternative options discussed and understood by patient/family.          Active Hospital Problems    Diagnosis  POA    Ganglion [M67.40]  Yes      Resolved Hospital Problems   No resolved problems to display.

## 2019-11-01 NOTE — ANESTHESIA PREPROCEDURE EVALUATION
2019  Mahnaz Trivedi is a 44 y.o., female here for ganglion cyst removal right wrist.  Past Medical History:   Diagnosis Date    Hypertension      Past Surgical History:   Procedure Laterality Date     SECTION      TUBAL LIGATION       No current facility-administered medications on file prior to encounter.      Current Outpatient Medications on File Prior to Encounter   Medication Sig Dispense Refill    lisinopril-hydrochlorothiazide (PRINZIDE,ZESTORETIC) 20-12.5 mg per tablet Take 1 tablet by mouth once daily.           Anesthesia Evaluation    I have reviewed the Patient Summary Reports.    I have reviewed the Nursing Notes.   I have reviewed the Medications.     Review of Systems  Anesthesia Hx:  History of prior surgery of interest to airway management or planning:       Physical Exam  General:  Well nourished    Airway/Jaw/Neck:  Airway Findings: Mouth Opening: Normal Tongue: Normal  General Airway Assessment: Adult  Mallampati: II  TM Distance: 4 - 6 cm  Jaw/Neck Findings:  Neck ROM: Normal ROM     Eyes/Ears/Nose:  EYES/EARS/NOSE FINDINGS: Normal   Dental:  Dental Findings: In tact   Chest/Lungs:  Chest/Lungs Findings: Clear to auscultation, Normal Respiratory Rate     Heart/Vascular:  Heart Findings: Rate: Normal  Rhythm: Regular Rhythm  Sounds: Normal  Vascular Findings: Normal    Abdomen:  Abdomen Findings:  Normal, Soft, Nontender      Skin:  Skin Findings: Normal    Mental Status:  Mental Status Findings:  Cooperative, Alert and Oriented         Anesthesia Plan  Type of Anesthesia, risks & benefits discussed:  Anesthesia Type:  general, regional, MAC  Patient's Preference:   Intra-op Monitoring Plan: standard ASA monitors  Intra-op Monitoring Plan Comments:   Post Op Pain Control Plan: multimodal analgesia, per primary service following discharge from PACU, peripheral nerve  block and IV/PO Opioids PRN  Post Op Pain Control Plan Comments:   Induction:   IV  Beta Blocker:  Patient is not currently on a Beta-Blocker (No further documentation required).       Informed Consent: Patient understands risks and agrees with Anesthesia plan.  Questions answered. Anesthesia consent signed with patient.  ASA Score: 2     Day of Surgery Review of History & Physical:    H&P update referred to the surgeon.         Ready For Surgery From Anesthesia Perspective.

## 2019-11-01 NOTE — ANESTHESIA PROCEDURE NOTES
Infraclavicular Block    Patient location during procedure: pre-op   Block not for primary anesthetic.  Reason for block: post-op pain management   Post-op Pain Location: Right wrist pain  Start time: 11/1/2019 11:29 AM  Timeout: 11/1/2019 11:28 AM   End time: 11/1/2019 11:33 AM    Staffing  Authorizing Provider: Noé Lambert MD  Performing Provider: Noé Lambert MD    Preanesthetic Checklist  Completed: patient identified, site marked, surgical consent, pre-op evaluation, timeout performed, IV checked, risks and benefits discussed and monitors and equipment checked  Peripheral Block  Patient position: sitting  Prep: ChloraPrep  Patient monitoring: heart rate, cardiac monitor, continuous pulse ox, continuous capnometry and frequent blood pressure checks  Block type: infraclavicular  Laterality: right  Injection technique: single shot  Needle  Needle type: Stimuplex   Needle gauge: 21 G  Needle length: 4 in  Needle localization: ultrasound guidance and anatomical landmarks   -ultrasound image captured on disc.  Assessment  Injection assessment: negative aspiration and negative parasthesia  Paresthesia pain: none  Heart rate change: no  Slow fractionated injection: yes  Additional Notes  VSS.  [facility] RN monitoring vitals throughout procedure.  Patient tolerated procedure well.

## 2019-11-01 NOTE — BRIEF OP NOTE
[facility]  Brief Operative Note     SUMMARY     Surgery Date: 11/1/2019     Surgeon(s) and Role:     * Jazmine Del Valle MD - Primary    Assisting Surgeon: None    Pre-op Diagnosis:  Ganglion cyst of volar aspect of right wrist [M67.431]    Post-op Diagnosis:  Post-Op Diagnosis Codes:     * Ganglion cyst of volar aspect of right wrist [M67.431]    Procedure(s) (LRB):  EXCISION, GANGLION CYST, WRIST right volar wrist (Right)    Anesthesia: Local MAC    Description of the findings of the procedure: see op note     Findings/Key Components: see op note    Estimated Blood Loss: * No values recorded between 11/1/2019 11:56 AM and 11/1/2019 12:31 PM *         Specimens:   Specimen (12h ago, onward)     Start     Ordered    11/01/19 1212  Specimen to Pathology - Surgery  Once     Comments:  Gallbladder      11/01/19 1212    Signed and Held  Specimen to Pathology - Surgery  Once     Comments:  Gallbladder      Signed and Held                Discharge Note    SUMMARY     Admit Date: 11/1/2019    Discharge Date and Time:  11/01/2019 12:31 PM    Hospital Course (synopsis of major diagnoses, care, treatment, and services provided during the course of the hospital stay): Patient taken to operating room for above stated procedure. She tolerated procedure well. Awakened from anesthesia and taken to the recovery room in stable condition, awaiting discharge home.     Final Diagnosis: Post-Op Diagnosis Codes:     * Ganglion cyst of volar aspect of right wrist [M67.431]    Disposition: Home or Self Care    Follow Up/Patient Instructions:     Medications: per med rec  Reconciled Home Medications:      Medication List      ASK your doctor about these medications    lisinopril-hydrochlorothiazide 20-12.5 mg per tablet  Commonly known as:  PRINZIDE,ZESTORETIC  Take 1 tablet by mouth once daily.          No discharge procedures on file.

## 2019-11-04 NOTE — OP NOTE
[facility]  Surgery Department  Operative Note    SUMMARY     Date of Procedure: 11/1/2019     Procedure: Procedure(s) (LRB):  EXCISION, GANGLION CYST, WRIST right volar wrist (Right)     Surgeon(s) and Role:     * Jazmine Del Valle MD - Primary    Assisting Surgeon: None    Pre-Operative Diagnosis: Ganglion cyst of volar aspect of right wrist [M67.431]    Post-Operative Diagnosis: Post-Op Diagnosis Codes:     * Ganglion cyst of volar aspect of right wrist [M67.431]    Anesthesia: Local MAC    Technical Procedures Used: surgery    Description of the Findings of the Procedure:  Procedure 1.  Excisional biopsy right volar ganglion cyst 2.  Splint application right wrist    Specimens mass from right wrist   complications none   indication for procedure this Farooq is a 44-year-old female with enlarging right volar wrist mass that was becoming very painful and bothersome to her after much discussion she elected for surgical intervention risks and benefits were explained to the patient from clinic consents were signed in clinic  Procedure in detail the correct site was marked with the patient's participation in the holding area the patient underwent regional seizure was brought to the operating placed in supine position underwent MAC anesthesia well-padded nonsterile tourniquet was placed on the right upper extremity and right upper extremities prepped draped normal sterile fashion time-out was conducted for the correct procedure to be indicated IV antibiotics given patient preoperatively time-out was conducted the correct procedure be indicated IV antibiotics were given to the patient preoperatively antibiotics were given to patient preoperatively longitudinal incision was marked out the arm was exsanguinated an Esmarch tourniquet insufflated 250 mmHg incision was made careful dissection around the cyst was maintained the cyst measured 1.5 x 1.5 cm it was taken out in completion and passed off  the back table there was a stalk going down into the joint and this was cauterized with bipolar cautery and rongeured the areas and irrigated copious amounts normal saline Vicryl Monocryl Dermabond closed the skin sterile dressing was applied tourniquet was deflated brisk cap refill and seed patient was placed in a well-padded volar slab splint tolerate suture was brought to compare in stable condition    Postop plans patient keep the dressing clean dry and intact will see her back 2 weeks time pathology to be given to patient at that time    Significant Surgical Tasks Conducted by the Assistant(s), if Applicable: none    Complications: No    Estimated Blood Loss (EBL): * No values recorded between 11/1/2019 11:56 AM and 11/1/2019 12:29 PM *           Implants: * No implants in log *    Specimens:   Specimen (12h ago, onward)     Start     Ordered    Signed and Held  Specimen to Pathology - Surgery  Once     Comments:  Gallbladder      Signed and Held                        Condition: Good    Disposition: PACU - hemodynamically stable.    Attestation: I was present and scrubbed for the entire procedure.    Discharge Note    SUMMARY     Admit Date: 11/1/2019    Discharge Date and Time:  11/04/2019 10:42 AM    Hospital Course (synopsis of major diagnoses, care, treatment, and services provided during the course of the hospital stay): surgery     Final Diagnosis: Post-Op Diagnosis Codes:     * Ganglion cyst of volar aspect of right wrist [M67.431]    Disposition: Home or Self Care    Follow Up/Patient Instructions:     Medications:  Reconciled Home Medications:      Medication List      START taking these medications    acetaminophen-codeine 300-30mg 300-30 mg Tab  Commonly known as:  TYLENOL #3  Take 1 tablet by mouth every 6 (six) hours as needed.        CONTINUE taking these medications    lisinopril-hydrochlorothiazide 20-12.5 mg per tablet  Commonly known as:  PRINZIDE,ZESTORETIC  Take 1 tablet by mouth once  daily.          Discharge Procedure Orders   Keep surgical extremity elevated     Lifting restrictions     Notify your health care provider if you experience any of the following:  temperature >100.4     Notify your health care provider if you experience any of the following:  severe uncontrolled pain     Notify your health care provider if you experience any of the following:  redness, tenderness, or signs of infection (pain, swelling, redness, odor or green/yellow discharge around incision site)     Notify your health care provider if you experience any of the following:  worsening rash     Leave dressing on - Keep it clean, dry, and intact until clinic visit     Activity as tolerated

## 2019-11-08 NOTE — ADDENDUM NOTE
Addendum  created 11/08/19 1431 by Noé Lambert MD    Diagnosis association updated, Intraprocedure Blocks edited, Sign clinical note

## 2019-11-13 ENCOUNTER — TELEPHONE (OUTPATIENT)
Dept: ORTHOPEDICS | Facility: CLINIC | Age: 44
End: 2019-11-13

## 2019-11-13 NOTE — TELEPHONE ENCOUNTER
Left a voicemail instructing the patient to call and let the clinic staff know exactly what paperwork she needs. Unsure if she is asking for FMLA or a doctor's note. Left the clinic's phone number.

## 2019-11-13 NOTE — TELEPHONE ENCOUNTER
----- Message from Alaina Colmenares LPN sent at 11/12/2019  4:24 PM CST -----  Contact: Patient      ----- Message -----  From: Destiny Briceno RN  Sent: 11/12/2019  10:18 AM CST  To: Ivon DAVALOS Staff        ----- Message -----  From: Tatyana Lowe  Sent: 11/12/2019  10:13 AM CST  To: Tarsha Carpio Staff    Need Advice:      Reason For Call: patient needs a return back to work paper. And would like to see if she can get it emailed to her       Communication Preference: [phone]      Additional Information:

## 2019-11-14 ENCOUNTER — OFFICE VISIT (OUTPATIENT)
Dept: ORTHOPEDICS | Facility: CLINIC | Age: 44
End: 2019-11-14
Payer: COMMERCIAL

## 2019-11-14 VITALS
HEIGHT: 66 IN | WEIGHT: 164 LBS | HEART RATE: 83 BPM | SYSTOLIC BLOOD PRESSURE: 109 MMHG | DIASTOLIC BLOOD PRESSURE: 73 MMHG | BODY MASS INDEX: 26.36 KG/M2

## 2019-11-14 DIAGNOSIS — M67.431 GANGLION CYST OF VOLAR ASPECT OF RIGHT WRIST: Primary | ICD-10-CM

## 2019-11-14 PROCEDURE — 99024 POSTOP FOLLOW-UP VISIT: CPT | Mod: S$GLB,,, | Performed by: PHYSICIAN ASSISTANT

## 2019-11-14 PROCEDURE — 99999 PR PBB SHADOW E&M-EST. PATIENT-LVL III: ICD-10-PCS | Mod: PBBFAC,,, | Performed by: PHYSICIAN ASSISTANT

## 2019-11-14 PROCEDURE — 99999 PR PBB SHADOW E&M-EST. PATIENT-LVL III: CPT | Mod: PBBFAC,,, | Performed by: PHYSICIAN ASSISTANT

## 2019-11-14 PROCEDURE — 99024 PR POST-OP FOLLOW-UP VISIT: ICD-10-PCS | Mod: S$GLB,,, | Performed by: PHYSICIAN ASSISTANT

## 2019-11-14 NOTE — LETTER
November 14, 2019                   [facility] [address]  Orthopedics  [address], [address]  [facility] [address]  Phone: [phone]   November 14, 2019     Patient: Mahnaz Trivedi   YOB: 1975   Date of Visit: 11/14/2019       To Whom it May Concern:    Mahnaz Trivedi was seen in my clinic on 11/14/2019.     Please excuse her from any classes or work missed.    If you have any questions or concerns, please don't hesitate to call.    Sincerely,         Ivonne Fournier MA

## 2019-11-14 NOTE — LETTER
[facility] [address] [facility]  [address], [address]  [facility] [address]  Phone: [phone]     11/14/2019    To Whom It May Concern,     Mahnaz Trivedi is status post surgery on the right wrist. She may return to work on 12/2/19. She may not do any heavy lifting initially but may gradually increase her lifting as tolerated. 12/13/19, she will be released for heavy lifting. If you have questions, please call.     Sincerely,       Melida Pleitez PA-C  [facility]   [facility]  [place], [place]

## 2019-11-14 NOTE — PROGRESS NOTES
"Ms. Trivedi is here today for a post-operative visit.  She is 13 days status post right volar wrist ganglion cyst excision by Dr. Del Valle on 11/1/19. She reports that she is doing well.  Pain is 0/10.  She is not taking pain medication.  She denies fever, chills, and sweats since the time of the surgery.     Physical exam:    Vitals:    11/14/19 1412   BP: 109/73   Pulse: 83   Weight: 74.4 kg (164 lb)   Height: 5' 6" (1.676 m)   PainSc: 0-No pain     Vital signs are stable, patient is afebrile.  Patient is well dressed and well groomed, no acute distress.  Alert and oriented to person, place, and time.  Post op dressing taken down.  Incision is clean, dry and intact.  There is no erythema or exudate.  There is no sign of any infection. She is NVI. Sutures clipped without difficulty. Fair wrist motion, good finger motion.    FINAL PATHOLOGIC DIAGNOSIS  Soft tissue, right volar wrist (excision):  Cystic fibroconnective and adipose tissue with degenerative changes  Consistent with ganglion cyst    Assessment:  status post right volar wrist ganglion cyst excision by Dr. Del Valle on 11/1/19    Plan:  Mahnaz was seen today for post-op evaluation.    Diagnoses and all orders for this visit:    Ganglion cyst of volar aspect of right wrist    - PO instruction reviewed and provided to patient  -pathology report reviewed and given to pt   -brace given for use as needed   -work letter provided  -RTC as needed      Melida Pleitez PA-C  [facility]   [facility] [place], [place]      "

## 2019-12-02 ENCOUNTER — TELEPHONE (OUTPATIENT)
Dept: ORTHOPEDICS | Facility: CLINIC | Age: 44
End: 2019-12-02

## 2019-12-02 NOTE — TELEPHONE ENCOUNTER
Lm on vm informing pt I will be following up with her sometime tomorrow once lead nurse is back in clinic.

## 2019-12-03 ENCOUNTER — TELEPHONE (OUTPATIENT)
Dept: ORTHOPEDICS | Facility: CLINIC | Age: 44
End: 2019-12-03

## 2019-12-03 NOTE — TELEPHONE ENCOUNTER
Spoke with pt in regards to paperwork and work letter. I informed pt I have faxed over her paperwork to medical records & updated work letter has been sent out by mail. Pt verbalized understanding.

## 2019-12-27 ENCOUNTER — HOSPITAL ENCOUNTER (EMERGENCY)
Facility: HOSPITAL | Age: 44
Discharge: HOME | End: 2019-12-27
Attending: EMERGENCY MEDICINE
Payer: COMMERCIAL

## 2019-12-27 VITALS
DIASTOLIC BLOOD PRESSURE: 82 MMHG | HEIGHT: 66 IN | HEART RATE: 83 BPM | OXYGEN SATURATION: 98 % | RESPIRATION RATE: 16 BRPM | WEIGHT: 150 LBS | TEMPERATURE: 98 F | SYSTOLIC BLOOD PRESSURE: 117 MMHG | BODY MASS INDEX: 24.11 KG/M2

## 2019-12-27 DIAGNOSIS — M79.641 RIGHT HAND PAIN: Primary | ICD-10-CM

## 2019-12-27 DIAGNOSIS — M79.643 HAND PAIN: ICD-10-CM

## 2019-12-27 DIAGNOSIS — T14.90XA TRAUMA: ICD-10-CM

## 2019-12-27 DIAGNOSIS — S69.90XA HAND INJURY: ICD-10-CM

## 2019-12-27 DIAGNOSIS — S69.91XA HAND INJURY, RIGHT, INITIAL ENCOUNTER: ICD-10-CM

## 2019-12-27 LAB
B-HCG UR QL: NEGATIVE
CTP QC/QA: YES

## 2019-12-27 PROCEDURE — 99284 EMERGENCY DEPT VISIT MOD MDM: CPT | Mod: 25

## 2019-12-27 PROCEDURE — 96372 THER/PROPH/DIAG INJ SC/IM: CPT | Mod: 59

## 2019-12-27 PROCEDURE — 81025 URINE PREGNANCY TEST: CPT | Performed by: PHYSICIAN ASSISTANT

## 2019-12-27 PROCEDURE — 63600175 PHARM REV CODE 636 W HCPCS: Performed by: PHYSICIAN ASSISTANT

## 2019-12-27 PROCEDURE — 29125 APPL SHORT ARM SPLINT STATIC: CPT | Mod: RT

## 2019-12-27 RX ORDER — HYDROCODONE BITARTRATE AND ACETAMINOPHEN 5; 325 MG/1; MG/1
1 TABLET ORAL EVERY 4 HOURS PRN
Qty: 12 TABLET | Refills: 0 | Status: SHIPPED | OUTPATIENT
Start: 2019-12-27 | End: 2019-12-30

## 2019-12-27 RX ORDER — HYDROMORPHONE HYDROCHLORIDE 2 MG/ML
0.5 INJECTION, SOLUTION INTRAMUSCULAR; INTRAVENOUS; SUBCUTANEOUS
Status: COMPLETED | OUTPATIENT
Start: 2019-12-27 | End: 2019-12-27

## 2019-12-27 RX ADMIN — HYDROMORPHONE HYDROCHLORIDE 0.5 MG: 2 INJECTION, SOLUTION INTRAMUSCULAR; INTRAVENOUS; SUBCUTANEOUS at 03:12

## 2019-12-27 NOTE — DISCHARGE INSTRUCTIONS
Elevate the arm above your heart several times a day.  Take pain medication as prescribed.  Do not drive while taking pain medication as it could make you sleepy.  You may take addition motrin if you would like to help reduce swelling.  YOU MUST follow up with an orthopedist.  Do not remove the splint until you see the orthopedist.
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
## 2020-01-06 DIAGNOSIS — T14.8XXA FRACTURE: Primary | ICD-10-CM

## 2020-01-16 ENCOUNTER — DOCUMENTATION ONLY (OUTPATIENT)
Dept: ORTHOPEDICS | Facility: CLINIC | Age: 45
End: 2020-01-16

## 2020-01-16 ENCOUNTER — HOSPITAL ENCOUNTER (OUTPATIENT)
Dept: RADIOLOGY | Facility: OTHER | Age: 45
Discharge: HOME | End: 2020-01-16
Attending: PHYSICIAN ASSISTANT
Payer: COMMERCIAL

## 2020-01-16 ENCOUNTER — OFFICE VISIT (OUTPATIENT)
Dept: ORTHOPEDICS | Facility: CLINIC | Age: 45
End: 2020-01-16
Payer: COMMERCIAL

## 2020-01-16 VITALS
HEIGHT: 66 IN | BODY MASS INDEX: 24.1 KG/M2 | DIASTOLIC BLOOD PRESSURE: 63 MMHG | SYSTOLIC BLOOD PRESSURE: 107 MMHG | WEIGHT: 149.94 LBS | HEART RATE: 80 BPM

## 2020-01-16 DIAGNOSIS — T14.8XXA FRACTURE: ICD-10-CM

## 2020-01-16 DIAGNOSIS — M25.531 RIGHT WRIST PAIN: Primary | ICD-10-CM

## 2020-01-16 PROCEDURE — 73110 XR WRIST COMPLETE 3 VIEWS RIGHT: ICD-10-PCS | Mod: 26,RT,, | Performed by: RADIOLOGY

## 2020-01-16 PROCEDURE — 29125 PR APPLY FOREARM SPLINT,STATIC: ICD-10-PCS | Mod: 79,RT,S$GLB, | Performed by: PHYSICIAN ASSISTANT

## 2020-01-16 PROCEDURE — 73130 X-RAY EXAM OF HAND: CPT | Mod: TC,FY,RT

## 2020-01-16 PROCEDURE — 99214 OFFICE O/P EST MOD 30 MIN: CPT | Mod: 24,25,S$GLB, | Performed by: PHYSICIAN ASSISTANT

## 2020-01-16 PROCEDURE — 29125 APPL SHORT ARM SPLINT STATIC: CPT | Mod: 79,RT,S$GLB, | Performed by: PHYSICIAN ASSISTANT

## 2020-01-16 PROCEDURE — 99999 PR PBB SHADOW E&M-EST. PATIENT-LVL III: CPT | Mod: PBBFAC,,, | Performed by: PHYSICIAN ASSISTANT

## 2020-01-16 PROCEDURE — 73130 XR HAND COMPLETE 3 VIEW RIGHT: ICD-10-PCS | Mod: 26,RT,, | Performed by: RADIOLOGY

## 2020-01-16 PROCEDURE — 3008F PR BODY MASS INDEX (BMI) DOCUMENTED: ICD-10-PCS | Mod: CPTII,S$GLB,, | Performed by: PHYSICIAN ASSISTANT

## 2020-01-16 PROCEDURE — 73110 X-RAY EXAM OF WRIST: CPT | Mod: TC,FY,RT

## 2020-01-16 PROCEDURE — 99999 PR PBB SHADOW E&M-EST. PATIENT-LVL III: ICD-10-PCS | Mod: PBBFAC,,, | Performed by: PHYSICIAN ASSISTANT

## 2020-01-16 PROCEDURE — 73130 X-RAY EXAM OF HAND: CPT | Mod: 26,RT,, | Performed by: RADIOLOGY

## 2020-01-16 PROCEDURE — 73110 X-RAY EXAM OF WRIST: CPT | Mod: 26,RT,, | Performed by: RADIOLOGY

## 2020-01-16 PROCEDURE — 99214 PR OFFICE/OUTPT VISIT, EST, LEVL IV, 30-39 MIN: ICD-10-PCS | Mod: 24,25,S$GLB, | Performed by: PHYSICIAN ASSISTANT

## 2020-01-16 PROCEDURE — 3008F BODY MASS INDEX DOCD: CPT | Mod: CPTII,S$GLB,, | Performed by: PHYSICIAN ASSISTANT
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
## 2020-02-03 ENCOUNTER — TELEPHONE (OUTPATIENT)
Dept: ORTHOPEDICS | Facility: CLINIC | Age: 45
End: 2020-02-03

## 2020-02-03 NOTE — TELEPHONE ENCOUNTER
Patient didn't go to her Mri because her insurance was not paying for it at this time and she is working on getting it approved.   Will wait to hear from patient as to when we can reschedule the MRI.    Tania Pierre LPN

## 2020-02-26 ENCOUNTER — TELEPHONE (OUTPATIENT)
Dept: ORTHOPEDICS | Facility: CLINIC | Age: 45
End: 2020-02-26

## 2020-02-26 DIAGNOSIS — M25.531 RIGHT WRIST PAIN: Primary | ICD-10-CM

## 2020-02-26 NOTE — TELEPHONE ENCOUNTER
----- Message from Melida Pleitez PA-C sent at 2/26/2020 11:16 AM CST -----  Received denial letter for pt MRI. Can we call and see how she is doing? Insurance did not approve MRI. We may need to try conservative treatment first-- therapy. Then if no improvement, can proceed with MRI.

## 2020-02-26 NOTE — TELEPHONE ENCOUNTER
"Spoke c pt. Informed her of information sent by MONIE Montez. She reprots that she is still wearing wrist brace & if she isn't wearing it, she feels as though her "wrist is open" on ulnar side. Pt agreed to begin PT/OT. Order routed to MONIE Montez. Pt expressed understanding & was thankful.     "

## 2020-03-25 ENCOUNTER — TELEPHONE (OUTPATIENT)
Dept: ORTHOPEDICS | Facility: CLINIC | Age: 45
End: 2020-03-25

## 2020-03-27 ENCOUNTER — TELEPHONE (OUTPATIENT)
Dept: ORTHOPEDICS | Facility: CLINIC | Age: 45
End: 2020-03-27

## 2020-03-27 NOTE — TELEPHONE ENCOUNTER
----- Message from Melida Pleitez PA-C sent at 3/27/2020  1:51 PM CDT -----  Ok can you please just document in her chart.   ----- Message -----  From: Malini Montano LPN  Sent: 3/26/2020   4:48 PM CDT  To: Melida Pleitez PA-C    Pt states that she still has pain, but she doesn't want to go anywhere for testing or dr's visits at this time        ----- Message -----  From: Melida Pleitez PA-C  Sent: 3/25/2020   2:40 PM CDT  To: Malini Montano LPN    Can we see how this pt is doing? I ordered an MRI when I saw her last but her insurance did not approve it. Is she still having any pain?     Melida

## 2020-06-06 RX ORDER — DICYCLOMINE HYDROCHLORIDE 10 MG/1
CAPSULE ORAL
Qty: 60 CAPSULE | Refills: 1 | OUTPATIENT
Start: 2020-06-06

## 2021-04-16 ENCOUNTER — PATIENT MESSAGE (OUTPATIENT)
Dept: RESEARCH | Facility: HOSPITAL | Age: 46
End: 2021-04-16

## 2023-03-22 ENCOUNTER — TELEPHONE (OUTPATIENT)
Dept: PODIATRY | Facility: CLINIC | Age: 48
End: 2023-03-22
Payer: COMMERCIAL

## 2023-03-22 NOTE — TELEPHONE ENCOUNTER
Staff informed patient there is no available appointments at this time. Staff offered to give patient other resources for podiatry, patient declined.

## 2023-04-10 ENCOUNTER — TELEPHONE (OUTPATIENT)
Dept: PODIATRY | Facility: CLINIC | Age: 48
End: 2023-04-10
Payer: COMMERCIAL

## 2023-04-10 NOTE — TELEPHONE ENCOUNTER
Staff informed patient there are no available appointments this afternoon.    Patient verbalized understanding and will keep appointment at 12 p.      ----- Message from Yisel Dumont sent at 4/10/2023  9:47 AM CDT -----  Regarding: CALL BACK  Name of Who is Calling: CHAN OROZCO [[id number]]           What is the request in detail: Pt stated that she would like to come in between the hours of 3:00Pm and 4:00PM.Please contact to further discuss and advise.            Can the clinic reply by [facility]: no           [facility]: [phone]

## 2023-06-07 NOTE — TELEPHONE ENCOUNTER
Spoke with pt. Informed her that cons for ganglion cyst of wrist would be best served by hand clinic. Number provided to schedule appt.   This document is complete and the patient is ready for discharge.

## 2024-04-30 DIAGNOSIS — M79.672 LEFT FOOT PAIN: Primary | ICD-10-CM

## 2024-05-13 ENCOUNTER — CLINICAL SUPPORT (OUTPATIENT)
Dept: REHABILITATION | Facility: OTHER | Age: 49
End: 2024-05-13
Payer: MEDICAID

## 2024-05-13 DIAGNOSIS — M79.672 LEFT FOOT PAIN: Primary | ICD-10-CM

## 2024-05-13 DIAGNOSIS — M62.81 MUSCLE WEAKNESS AFFECTING MOVEMENT OF FOOT: ICD-10-CM

## 2024-05-13 PROCEDURE — 97110 THERAPEUTIC EXERCISES: CPT | Mod: PN

## 2024-05-13 PROCEDURE — 97161 PT EVAL LOW COMPLEX 20 MIN: CPT | Mod: PN
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
## 2024-05-15 ENCOUNTER — DOCUMENTATION ONLY (OUTPATIENT)
Dept: REHABILITATION | Facility: OTHER | Age: 49
End: 2024-05-15

## 2024-05-15 NOTE — PROGRESS NOTES
Physical Therapist and Physical Therapist Assistant met face to face to discuss patient's treatment plan and progress towards established goals. Pt will be seen by a physical therapist minimally every 6th visit or every 30 days.    Aroldo Knight, PTA  05/15/2024

## 2024-05-15 NOTE — PROGRESS NOTES
Patient was scheduled for a follow up physical therapy appointment at [facility] location on 05/15/2024 . Patient failed to appear for the appointment without prior notification today.       Aroldo Knight, PTA  05/15/2024

## 2024-05-28 ENCOUNTER — PATIENT MESSAGE (OUTPATIENT)
Dept: REHABILITATION | Facility: OTHER | Age: 49
End: 2024-05-28
Payer: MEDICAID

## 2024-05-28 ENCOUNTER — DOCUMENTATION ONLY (OUTPATIENT)
Dept: REHABILITATION | Facility: OTHER | Age: 49
End: 2024-05-28
Payer: MEDICAID

## 2024-05-28 NOTE — PROGRESS NOTES
Patient was scheduled for a physical therapy treatment appointment at [facility] location today. Patient failed to appear for the appointment without prior notification today.     Rosalia Laureano , PT

## (undated) DEVICE — DRESSING XEROFORM 1X8IN

## (undated) DEVICE — SUT MONOCRYL 4-0 UD P-3 18

## (undated) DEVICE — TOURNIQUET SB QC SP 18X4IN

## (undated) DEVICE — CORD BIPOLAR 12 FOOT

## (undated) DEVICE — BANDAGE ELASTIC ACE 2IN 10/CA

## (undated) DEVICE — GAUZE SPONGE 4X4 12PLY

## (undated) DEVICE — Device

## (undated) DEVICE — SCRUB 10% POVIDONE IODINE 4OZ

## (undated) DEVICE — NDL 22GA X1 1/2 REG BEVEL

## (undated) DEVICE — GLOVE BIOGEL PI MICRO INDIC 7

## (undated) DEVICE — SUT VICRYL PLUS 4-0 P-3 27

## (undated) DEVICE — NDL 18GA X1 1/2 REG BEVEL

## (undated) DEVICE — DRAPE STERI-DRAPE 1000 17X11IN

## (undated) DEVICE — SUT 4/0 18IN ETHILON BL P3

## (undated) DEVICE — DRESSING N ADH OIL EMUL 3X3

## (undated) DEVICE — SUT VICRYL 4-0 18 P-3

## (undated) DEVICE — SEE MEDLINE ITEM 157131

## (undated) DEVICE — GLOVE BIOGEL SKINSENSE PI 7.0

## (undated) DEVICE — SYR B-D DISP CONTROL 10CC100/C

## (undated) DEVICE — BANDAGE GAUZE 6PLY FLUFF 2X3